# Patient Record
Sex: MALE | Race: AMERICAN INDIAN OR ALASKA NATIVE
[De-identification: names, ages, dates, MRNs, and addresses within clinical notes are randomized per-mention and may not be internally consistent; named-entity substitution may affect disease eponyms.]

---

## 2021-11-10 ENCOUNTER — HOSPITAL ENCOUNTER (EMERGENCY)
Dept: HOSPITAL 5 - ED | Age: 21
Discharge: LEFT BEFORE DISCHARGE | End: 2021-11-10
Payer: SELF-PAY

## 2021-11-10 VITALS — DIASTOLIC BLOOD PRESSURE: 42 MMHG | SYSTOLIC BLOOD PRESSURE: 102 MMHG

## 2021-11-10 DIAGNOSIS — R11.0: ICD-10-CM

## 2021-11-10 DIAGNOSIS — R10.84: Primary | ICD-10-CM

## 2021-11-10 LAB
ALBUMIN SERPL-MCNC: 4.2 G/DL (ref 3.9–5)
ALT SERPL-CCNC: 17 UNITS/L (ref 7–56)
BAND NEUTROPHILS # (MANUAL): 0 K/MM3
BUN SERPL-MCNC: 8 MG/DL (ref 9–20)
BUN/CREAT SERPL: 8 %
CALCIUM SERPL-MCNC: 8.8 MG/DL (ref 8.4–10.2)
HCT VFR BLD CALC: 45.8 % (ref 35.5–45.6)
HEMOLYSIS INDEX: 14
HGB BLD-MCNC: 15.3 GM/DL (ref 11.8–15.2)
MCHC RBC AUTO-ENTMCNC: 33 % (ref 32–34)
MCV RBC AUTO: 90 FL (ref 84–94)
MYELOCYTES # (MANUAL): 0 K/MM3
PLATELET # BLD: 226 K/MM3 (ref 140–440)
PROMYELOCYTES # (MANUAL): 0 K/MM3
RBC # BLD AUTO: 5.09 M/MM3 (ref 3.65–5.03)
TOTAL CELLS COUNTED BLD: 100
WBC TOXIC VACUOLES BLD QL SMEAR: (no result)

## 2021-11-10 PROCEDURE — 83690 ASSAY OF LIPASE: CPT

## 2021-11-10 PROCEDURE — 96375 TX/PRO/DX INJ NEW DRUG ADDON: CPT

## 2021-11-10 PROCEDURE — 96374 THER/PROPH/DIAG INJ IV PUSH: CPT

## 2021-11-10 PROCEDURE — 74177 CT ABD & PELVIS W/CONTRAST: CPT

## 2021-11-10 PROCEDURE — 99284 EMERGENCY DEPT VISIT MOD MDM: CPT

## 2021-11-10 PROCEDURE — 96361 HYDRATE IV INFUSION ADD-ON: CPT

## 2021-11-10 PROCEDURE — 85007 BL SMEAR W/DIFF WBC COUNT: CPT

## 2021-11-10 PROCEDURE — 85025 COMPLETE CBC W/AUTO DIFF WBC: CPT

## 2021-11-10 PROCEDURE — 80053 COMPREHEN METABOLIC PANEL: CPT

## 2021-11-10 PROCEDURE — 36415 COLL VENOUS BLD VENIPUNCTURE: CPT

## 2021-11-10 NOTE — EMERGENCY DEPARTMENT REPORT
ED Abdominal Pain HPI





- General


Chief Complaint: Abdominal Pain


Stated Complaint: STOMACH PAINS, HEADACHE


Time Seen by Provider: 11/10/21 07:59


Source: patient


Mode of arrival: Ambulatory


Limitations: No Limitations





- History of Present Illness


Initial Comments: 





This is a 21-year-old male nontoxic, well nourished in appearance, no acute 

signs of distress presents to the ED with c/o of nausea and abdominal pain  2 

days.   Patient denies any vomiting.  Patient describes abdominal pain as 

cramping and aching with level of 8/10 diffuse.  Patient denies chest pain, 

short of breath, fever, hemoptysis, blood in stool, chills, headache, stiff 

neck, numbness or tingling.  Patient denies any diarrhea or constipation.  

Denies any blood in stool.  Patient denies any recent travels.  Patient denies 

any drug allergies.


MD Complaint: abdominal pain


-: days(s)


Location: diffuse


Radiation: none


Migration to: no migration


Severity: mild


Severity scale (0 -10): 8


Quality: cramping, aching


Consistency: constant


Improves With: nothing


Worsens With: nothing


Associated Symptoms: nausea.  denies: vomiting, diarrhea, fever, chills, 

constipation, dysuria, hematemesis, hematochezia, melena, hematuria, anorexia, 

syncope





- Related Data


                                    Allergies











Allergy/AdvReac Type Severity Reaction Status Date / Time


 


No Known Allergies Allergy   Verified 11/10/21 07:38














ED Review of Systems


ROS: 


Stated complaint: STOMACH PAINS, HEADACHE


Other details as noted in HPI





Comment: All other systems reviewed and negative


Constitutional: denies: chills, fever


Eyes: denies: eye pain, eye discharge, vision change


ENT: denies: ear pain, throat pain


Respiratory: denies: cough, shortness of breath, wheezing


Cardiovascular: denies: chest pain, palpitations


Endocrine: no symptoms reported


Gastrointestinal: abdominal pain, nausea.  denies: vomiting, diarrhea, 

constipation, hematemesis, melena, hematochezia


Genitourinary: denies: urgency, dysuria


Musculoskeletal: denies: back pain, joint swelling, arthralgia


Skin: denies: rash, lesions


Neurological: denies: headache, weakness, paresthesias


Psychiatric: denies: anxiety, depression


Hematological/Lymphatic: denies: easy bleeding, easy bruising





ED Physical Exam





- General


Limitations: No Limitations


General appearance: alert, in no apparent distress





- Head


Head exam: Present: atraumatic, normocephalic





- Eye


Eye exam: Present: normal appearance





- ENT


ENT exam: Present: normal exam, normal orophraynx





- Neck


Neck exam: Present: normal inspection, full ROM.  Absent: tenderness, 

meningismus, lymphadenopathy





- Respiratory


Respiratory exam: Present: normal lung sounds bilaterally.  Absent: respiratory 

distress, wheezes, rales, rhonchi, stridor, chest wall tenderness, accessory 

muscle use, decreased breath sounds, prolonged expiratory





- Cardiovascular


Cardiovascular Exam: Present: regular rate, normal rhythm, normal heart sounds. 

Absent: bradycardia, tachycardia, irregular rhythm, systolic murmur, diastolic 

murmur, rubs, gallop





- GI/Abdominal


GI/Abdominal exam: Present: soft, tenderness (Diffuse), normal bowel sounds.  

Absent: distended, guarding, rebound, rigid, diminished bowel sounds





- Extremities Exam


Extremities exam: Present: normal inspection, full ROM





- Back Exam


Back exam: Present: normal inspection, full ROM.  Absent: tenderness, CVA 

tenderness (R), CVA tenderness (L), muscle spasm, paraspinal tenderness, 

vertebral tenderness, rash noted





- Neurological Exam


Neurological exam: Present: alert, oriented X3, normal gait





- Psychiatric


Psychiatric exam: Present: normal affect, normal mood





- Skin


Skin exam: Present: warm, dry, intact, normal color.  Absent: rash





ED Course


                                   Vital Signs











  11/10/21





  07:40


 


Temperature 98.9 F


 


Pulse Rate 75


 


Respiratory 18





Rate 


 


Blood Pressure 102/42


 


O2 Sat by Pulse 96





Oximetry 














- Reevaluation(s)


Reevaluation #1: 





11/10/21 08:34


Patient is speaking in full sentences with no signs of distress noted.


Reevaluation #2: 





11/10/21 14:07


Patient is not in his room.  Lisa Herndon (RN) tried to contact patient with 

no success.  Patient left against medical eyes without letting anybody know.








ED Medical Decision Making





- Lab Data


Result diagrams: 


                                 11/10/21 08:04





                                 11/10/21 08:04








                                   Lab Results











  11/10/21 11/10/21 Range/Units





  08:04 08:04 


 


WBC  6.2   (4.5-11.0)  K/mm3


 


RBC  5.09 H   (3.65-5.03)  M/mm3


 


Hgb  15.3 H   (11.8-15.2)  gm/dl


 


Hct  45.8 H   (35.5-45.6)  %


 


MCV  90   (84-94)  fl


 


MCH  30   (28-32)  pg


 


MCHC  33   (32-34)  %


 


RDW  12.7 L   (13.2-15.2)  %


 


Plt Count  226   (140-440)  K/mm3


 


Mono % (Auto)  Np   


 


Add Manual Diff  Complete   


 


Total Counted  100   


 


Seg Neuts % (Manual)  70.0   (40.0-70.0)  %


 


Lymphocytes % (Manual)  11.0 L   (13.4-35.0)  %


 


Monocytes % (Manual)  19.0 H   (0.0-7.3)  %


 


Nucleated RBC %  Not Reportable   


 


Seg Neutrophils # Man  4.3   (1.8-7.7)  K/mm3


 


Band Neutrophils #  0.0   K/mm3


 


Lymphocytes # (Manual)  0.7 L   (1.2-5.4)  K/mm3


 


Abs React Lymphs (Man)  0.0   K/mm3


 


Monocytes # (Manual)  1.2 H   (0.0-0.8)  K/mm3


 


Eosinophils # (Manual)  0.0   (0.0-0.4)  K/mm3


 


Basophils # (Manual)  0.0   (0.0-0.1)  K/mm3


 


Metamyelocytes #  0.0   K/mm3


 


Myelocytes #  0.0   K/mm3


 


Promyelocytes #  0.0   K/mm3


 


Blast Cells #  0.0   K/mm3


 


WBC Morphology  Not Reportable   


 


Hypersegmented Neuts  Not Reportable   


 


Hyposegmented Neuts  Not Reportable   


 


Hypogranular Neuts  Not Reportable   


 


Smudge Cells  Not Reportable   


 


Toxic Granulation  Not Reportable   


 


Toxic Vacuolation  1+   


 


Dohle Bodies  Not Reportable   


 


Pelger-Huet Anomaly  Not Reportable   


 


Vesta Rods  Not Reportable   


 


Platelet Estimate  Consistent w auto   


 


Clumped Platelets  Not Reportable   


 


Plt Clumps, EDTA  Not Reportable   


 


Large Platelets  Not Reportable   


 


Giant Platelets  Not Reportable   


 


Platelet Satelliting  Not Reportable   


 


Plt Morphology Comment  Not Reportable   


 


RBC Morphology  Normal   


 


Dimorphic RBCs  Not Reportable   


 


Polychromasia  Not Reportable   


 


Hypochromasia  Not Reportable   


 


Poikilocytosis  Not Reportable   


 


Anisocytosis  Not Reportable   


 


Microcytosis  Not Reportable   


 


Macrocytosis  Not Reportable   


 


Spherocytes  Not Reportable   


 


Pappenheimer Bodies  Not Reportable   


 


Sickle Cells  Not Reportable   


 


Target Cells  Not Reportable   


 


Tear Drop Cells  Not Reportable   


 


Ovalocytes  Not Reportable   


 


Helmet Cells  Not Reportable   


 


Fabian-Dilkon Bodies  Not Reportable   


 


Cabot Rings  Not Reportable   


 


[place] Cells  Not Reportable   


 


Bite Cells  Not Reportable   


 


Crenated Cell  Not Reportable   


 


Elliptocytes  Not Reportable   


 


Acanthocytes (Spur)  Not Reportable   


 


Rouleaux  Not Reportable   


 


Hemoglobin C Crystals  Not Reportable   


 


Schistocytes  Not Reportable   


 


Malaria parasites  Not Reportable   


 


Chris Bodies  Not Reportable   


 


Hem Pathologist Commnt  No   


 


Sodium   135 L  (137-145)  mmol/L


 


Potassium   3.8  (3.6-5.0)  mmol/L


 


Chloride   99.4  ()  mmol/L


 


Carbon Dioxide   23  (22-30)  mmol/L


 


Anion Gap   16  mmol/L


 


BUN   8 L  (9-20)  mg/dL


 


Creatinine   1.0  (0.8-1.3)  mg/dL


 


Estimated GFR   > 60  ml/min


 


BUN/Creatinine Ratio   8  %


 


Glucose   99  ()  mg/dL


 


Calcium   8.8  (8.4-10.2)  mg/dL


 


Total Bilirubin   0.70  (0.1-1.2)  mg/dL


 


AST   28  (5-40)  units/L


 


ALT   17  (7-56)  units/L


 


Alkaline Phosphatase   85  ()  units/L


 


Total Protein   8.1  (6.3-8.2)  g/dL


 


Albumin   4.2  (3.9-5)  g/dL


 


Albumin/Globulin Ratio   1.1  %


 


Lipase   15  (13-60)  units/L














- Radiology Data





CT ABDOMEN AND PELVIS WITH CONTRAST  HISTORY: LOWER abd pain 100 ML OMNI 300  

COMPARISON: None.  TECHNIQUE: Axial CT images were obtained through the abdomen 

and pelvis after 100 cc of Omnipaque 300 IV contrast. Sagittal and coronal 

reformatted images. All CT scans at this location are performed using CT dose 

reduction for ALARA by means of automated exposure control.   FINDINGS:  CT 

ABDOMEN: Lung Bases: Clear. Liver: No significant abnormality. Biliary: No 

significant abnormality. Spleen: No significant abnormality. Unenlarged. Panc

reas: No significant abnormality. Adrenals: No significant abnormality. Kidneys:

No significant abnormality. Lymphatics: No lymphadenopathy. Vasculature: No 

significant abnormality. Bowel/Peritoneum: No significant abnormality. No free 

air. No free fluid. Normal appendix.  CT PELVIS: : No significant abnormality.

 Osseous Structures: No significant abnormality. Additional Findings: None  

IMPRESSION: No significant abnormality.  Signer Name: Clayton Rodriguez Jr, MD 

Signed: 11/10/2021 10:30 AM Workstation Name: [id number]





- Medical Decision Making





21-year-old male that presents with abdominal pain with nausea.  Patient was 

stable and examined by me.  Labs has been obtained.  Urine ordered but was not 

able to to complete the order as patient was not in his room.  CT scan has been 

resulted by radiologist.  I was not able to reassess the patient as patient was 

not in his room.  As per RN (Madi Herndon), tried to contact the patient and 

unsuccessful.  Patient left against medical advice without telling anybody.


Critical care attestation.: 


If time is entered above; I have spent that time in minutes in the direct care 

of this critically ill patient, excluding procedure time.








ED Disposition


Clinical Impression: 


 Nausea





Abdominal pain


Qualifiers:


 Abdominal location: generalized Qualified Code(s): R10.84 - Generalized 

abdominal pain





Disposition: 07 LEFT AGAINST MEDICAL ADVICE


Is pt being admited?: No


Condition: Undetermined


Referrals: 


PRIMARY CARE,MD [Primary Care Provider] - 3-5 Days


Time of Disposition: 14:09

## 2021-11-16 NOTE — CAT SCAN REPORT
CT ABDOMEN AND PELVIS WITH CONTRAST



HISTORY: LOWER abd pain 100 ML OMNI 300 



COMPARISON: None.



TECHNIQUE: Axial CT images were obtained through the abdomen and pelvis after 100 cc of Omnipaque 300
 IV contrast. Sagittal and coronal reformatted images. All CT scans at this location are performed us
ing CT dose reduction for GENNY by means of automated exposure control.





FINDINGS:



CT ABDOMEN:

Lung Bases: Clear.

Liver: No significant abnormality.

Biliary: No significant abnormality.

Spleen: No significant abnormality.  Unenlarged.

Pancreas: No significant abnormality.

Adrenals: No significant abnormality.

Kidneys: No significant abnormality.

Lymphatics: No lymphadenopathy.

Vasculature: No significant abnormality. 

Bowel/Peritoneum: No significant abnormality. No free air. No free fluid. Normal appendix.



CT PELVIS:

: No significant abnormality.



Osseous Structures: No significant abnormality.

Additional Findings: None



IMPRESSION:

No significant abnormality.



Signer Name: Clayton Rodriguez Jr, MD 

Signed: 11/10/2021 11:30 AM

Workstation Name: [id number]

## 2021-12-31 ENCOUNTER — HOSPITAL ENCOUNTER (EMERGENCY)
Dept: HOSPITAL 5 - ED | Age: 21
Discharge: LEFT BEFORE DISCHARGE | End: 2021-12-31
Payer: SELF-PAY

## 2021-12-31 DIAGNOSIS — K13.79: Primary | ICD-10-CM

## 2021-12-31 DIAGNOSIS — Z53.21: ICD-10-CM
